# Patient Record
Sex: FEMALE | Race: WHITE | NOT HISPANIC OR LATINO | Employment: FULL TIME | URBAN - METROPOLITAN AREA
[De-identification: names, ages, dates, MRNs, and addresses within clinical notes are randomized per-mention and may not be internally consistent; named-entity substitution may affect disease eponyms.]

---

## 2017-06-10 ENCOUNTER — CONVERSION ENCOUNTER (OUTPATIENT)
Dept: RADIOLOGY | Facility: IMAGING CENTER | Age: 47
End: 2017-06-10

## 2021-07-28 ENCOUNTER — OFFICE VISIT (OUTPATIENT)
Dept: INTERNAL MEDICINE CLINIC | Facility: CLINIC | Age: 51
End: 2021-07-28
Payer: COMMERCIAL

## 2021-07-28 VITALS
TEMPERATURE: 98.6 F | DIASTOLIC BLOOD PRESSURE: 82 MMHG | HEIGHT: 66 IN | HEART RATE: 74 BPM | BODY MASS INDEX: 22.24 KG/M2 | SYSTOLIC BLOOD PRESSURE: 142 MMHG | OXYGEN SATURATION: 99 % | WEIGHT: 138.4 LBS

## 2021-07-28 DIAGNOSIS — R63.4 WEIGHT LOSS: Primary | ICD-10-CM

## 2021-07-28 DIAGNOSIS — Z11.4 ENCOUNTER FOR SCREENING FOR HIV: ICD-10-CM

## 2021-07-28 DIAGNOSIS — R10.13 EPIGASTRIC PAIN: ICD-10-CM

## 2021-07-28 DIAGNOSIS — Z11.59 ENCOUNTER FOR HEPATITIS C SCREENING TEST FOR LOW RISK PATIENT: ICD-10-CM

## 2021-07-28 PROCEDURE — 3725F SCREEN DEPRESSION PERFORMED: CPT | Performed by: INTERNAL MEDICINE

## 2021-07-28 PROCEDURE — 99214 OFFICE O/P EST MOD 30 MIN: CPT | Performed by: INTERNAL MEDICINE

## 2021-07-28 RX ORDER — FAMOTIDINE 20 MG/1
20 TABLET, FILM COATED ORAL 2 TIMES DAILY
Qty: 60 TABLET | Refills: 2 | Status: SHIPPED | OUTPATIENT
Start: 2021-07-28 | End: 2021-10-20

## 2021-07-28 RX ORDER — ACETAMINOPHEN 325 MG/1
650 TABLET ORAL EVERY 6 HOURS PRN
COMMUNITY

## 2021-07-28 NOTE — PROGRESS NOTES
Answers for HPI/ROS submitted by the patient on 7/22/2021  Chronicity: new  Onset: more than 1 month ago  Onset quality: sudden  Frequency: every several days  Episode duration: 2 days  Progression since onset: gradually improving  Pain location: epigastric region  Pain - numeric: 4/10  Pain quality: dull  Radiates to: does not radiate  anorexia: No  arthralgias: No  constipation: No  diarrhea: No  fever: No  flatus: No  headaches: No  hematochezia: No  melena: No  myalgias: No  weight loss: Yes  vomiting: No  Aggravated by: nothing  Relieved by: nothing  Diagnostic workup: GI consult     Assessment/Plan:     Diagnoses and all orders for this visit:    Weight loss  -     CT abdomen pelvis w wo contrast; Future    Epigastric pain  -     famotidine (PEPCID) 20 mg tablet; Take 1 tablet (20 mg total) by mouth 2 (two) times a day  -     CT abdomen pelvis w wo contrast; Future    Other orders  -     Omega-3 Fatty Acids (FISH OIL OMEGA-3 PO); Take 1 g by mouth daily  -     acetaminophen (TYLENOL) 325 mg tablet; Take 650 mg by mouth every 6 (six) hours as needed  -     Cholecalciferol 25 MCG (1000 UT) tablet; Take 2,000 Units by mouth daily             Subjective:   Chief Complaint   Patient presents with    Weight Loss     not loss of appetite, was 142# 4/20/21    Abdominal Pain     FLUOROSCOPY test done in April with [facility], has since seen GI, had EGD at the end of August     HM     has GI will make appt for colonoscopy after EGD, mammo in chart sent for update      Back Pain     mostly at night         Patient ID: Tre Uribe is a 48 y o  female  HPI  [de-identified] years young lady is here today for the acute visit although the symptoms are chronic she was having some problem with the epigastric pain and was seen by the [facility] and the patient received PPI which caused her the rash in the stop date  Barium upper GI was done which shows hiatal hernia and gastroesophageal reflux problem    The problems right now is that she is losing weight although she is eating well thyroid test was done which was unremarkable all the chemistries are blood test which were done in February were all within normal range I reviewed from the [facility]   In  she was 153 now she is 130  Her usual weight is 148-150  Her appetite is good and she is eating there is no problem with swallowing  Does not have any diarrhea nausea vomiting  Is not on any medication which can cause the weight loss  The following portions of the patient's history were reviewed and updated as appropriate: allergies, current medications, past family history, past medical history, past social history, past surgical history and problem list     Review of Systems   Constitutional: Negative for chills and fever  HENT: Negative for ear pain and sore throat  Eyes: Negative for pain and visual disturbance  Respiratory: Negative for cough and shortness of breath  Cardiovascular: Negative for chest pain and palpitations  Gastrointestinal: Positive for abdominal pain and nausea  Negative for constipation, diarrhea and vomiting  Genitourinary: Negative for dysuria and hematuria  Musculoskeletal: Negative for arthralgias, back pain and myalgias  Skin: Negative for color change and rash  Neurological: Negative for seizures, syncope and headaches  All other systems reviewed and are negative  History reviewed  No pertinent past medical history        Current Outpatient Medications:     Cholecalciferol 25 MCG (1000 UT) tablet, Take 2,000 Units by mouth daily, Disp: , Rfl:     acetaminophen (TYLENOL) 325 mg tablet, Take 650 mg by mouth every 6 (six) hours as needed, Disp: , Rfl:     Omega-3 Fatty Acids (FISH OIL OMEGA-3 PO), Take 1 g by mouth daily, Disp: , Rfl:     Allergies   Allergen Reactions    Omeprazole Itching       Social History   Past Surgical History:   Procedure Laterality Date    BREAST SURGERY  2017    Implants     SECTION 2004    HERNIA REPAIR  2006    Inguinal    LYMPH NODE BIOPSY  2007    Arm pit     Family History   Problem Relation Age of Onset    Coronary artery disease Mother     Arthritis Mother     Cancer Mother         Esophagus    Glaucoma Mother     Heart disease Father     Breast cancer Sister     Breast cancer additional onset Maternal Aunt        Objective:  /82 (BP Location: Left arm, Patient Position: Sitting, Cuff Size: Standard)   Pulse 74   Temp 98 6 °F (37 °C) (Temporal)   Ht 5' 6" (1 676 m)   Wt 62 8 kg (138 lb 6 4 oz)   SpO2 99%   BMI 22 34 kg/m²        Physical Exam  Constitutional:       Appearance: She is well-developed  HENT:      Right Ear: External ear normal    Eyes:      Extraocular Movements: Extraocular movements intact  Conjunctiva/sclera: Conjunctivae normal       Pupils: Pupils are equal, round, and reactive to light  Neck:      Thyroid: No thyromegaly  Vascular: No JVD  Cardiovascular:      Rate and Rhythm: Normal rate and regular rhythm  Heart sounds: Normal heart sounds  Pulmonary:      Breath sounds: Normal breath sounds  Abdominal:      General: Bowel sounds are normal       Palpations: Abdomen is soft  Musculoskeletal:         General: Normal range of motion  Cervical back: Normal range of motion  Lymphadenopathy:      Cervical: No cervical adenopathy  Skin:     General: Skin is dry  Neurological:      Mental Status: She is alert and oriented to person, place, and time  Deep Tendon Reflexes: Reflexes are normal and symmetric     Psychiatric:         Behavior: Behavior normal

## 2021-07-29 ENCOUNTER — TELEPHONE (OUTPATIENT)
Dept: ADMINISTRATIVE | Facility: OTHER | Age: 51
End: 2021-07-29

## 2021-07-29 NOTE — TELEPHONE ENCOUNTER
----- Message from Esau Zarate sent at 7/28/2021  3:16 PM EDT -----  Regarding: Breast [address] [facility] [place]  Contact: [phone]  07/28/21 3:16 PM    Hello, our patient Abram Batres has had Mammogram completed/performed  Please assist in updating the patient chart by pulling the Care Everywhere (CE) document  The date of service is 6/29/21       Thank you,  Jayson Fisher, [address] [facility]

## 2021-07-29 NOTE — TELEPHONE ENCOUNTER
----- Message from Esau Samuel sent at 7/28/2021  3:20 PM EDT -----  Regarding: Cervical [address]: [phone]  07/28/21 3:20 PM    Hello, our patient Natalie Brantley has had Pap Smear (HPV) aka Cervical Cancer Screening completed/performed  Please assist in updating the patient chart by pulling the Care Everywhere (CE) document  The date of service is 02/17/2021       Thank you,  Marc Roman, [facility]

## 2021-07-29 NOTE — TELEPHONE ENCOUNTER
Upon review of the In Basket request we were able to locate, review, and update the patient chart as requested for Pap Smear (HPV) aka Cervical Cancer Screening  Any additional questions or concerns should be emailed to the Practice Liaisons via [email or web address] email, please do not reply via In Basket      Thank you  Brendon Greenwood

## 2021-07-29 NOTE — TELEPHONE ENCOUNTER
Upon review of the In Basket request we were able to locate, review, and update the patient chart as requested for Mammogram     Any additional questions or concerns should be emailed to the Practice Liaisons via [email or web address] email, please do not reply via In Basket      Thank you  Lois Dexter

## 2021-08-05 ENCOUNTER — TELEPHONE (OUTPATIENT)
Dept: INTERNAL MEDICINE CLINIC | Age: 51
End: 2021-08-05

## 2021-08-05 ENCOUNTER — HOSPITAL ENCOUNTER (OUTPATIENT)
Dept: RADIOLOGY | Facility: IMAGING CENTER | Age: 51
Discharge: HOME | End: 2021-08-05
Payer: COMMERCIAL

## 2021-08-05 DIAGNOSIS — R63.4 WEIGHT LOSS: ICD-10-CM

## 2021-08-05 DIAGNOSIS — R10.13 EPIGASTRIC PAIN: ICD-10-CM

## 2021-08-05 PROCEDURE — G1004 CDSM NDSC: HCPCS

## 2021-08-05 PROCEDURE — 74178 CT ABD&PLV WO CNTR FLWD CNTR: CPT

## 2021-08-05 RX ADMIN — IOHEXOL 100 ML: 350 INJECTION, SOLUTION INTRAVENOUS at 08:26

## 2021-08-05 NOTE — TELEPHONE ENCOUNTER
I spoke with patient to let her know that her CT Scan was negative     If her pain comes back or doesn't go away she should call and get an appt with Dr Chelsey Conrad

## 2021-08-05 NOTE — TELEPHONE ENCOUNTER
BUDDY Per Patient called to let Dr Magan Middleton know that she got her CT Scan of her abdomen/ pelvis done this morning

## 2021-08-05 NOTE — TELEPHONE ENCOUNTER
Let her know that her CT scan was negative if she continued to have the abdominal pain then call and we will see her in the office

## 2021-08-06 ENCOUNTER — TELEPHONE (OUTPATIENT)
Dept: INTERNAL MEDICINE CLINIC | Age: 51
End: 2021-08-06

## 2021-08-06 NOTE — TELEPHONE ENCOUNTER
Pt called the office and stated she got the results of her abdomin and pelvis but she is still concerned about the weightloss and would like orders to be placed for CT scan of her lungs

## 2021-08-09 ENCOUNTER — OFFICE VISIT (OUTPATIENT)
Dept: INTERNAL MEDICINE CLINIC | Age: 51
End: 2021-08-09
Payer: COMMERCIAL

## 2021-08-09 VITALS
DIASTOLIC BLOOD PRESSURE: 82 MMHG | OXYGEN SATURATION: 98 % | BODY MASS INDEX: 22.21 KG/M2 | HEIGHT: 66 IN | HEART RATE: 68 BPM | WEIGHT: 138.2 LBS | SYSTOLIC BLOOD PRESSURE: 126 MMHG | TEMPERATURE: 98.8 F

## 2021-08-09 DIAGNOSIS — R10.13 EPIGASTRIC PAIN: ICD-10-CM

## 2021-08-09 DIAGNOSIS — Z12.11 ENCOUNTER FOR SCREENING FOR MALIGNANT NEOPLASM OF COLON: Primary | ICD-10-CM

## 2021-08-09 DIAGNOSIS — R63.4 WEIGHT LOSS: ICD-10-CM

## 2021-08-09 PROCEDURE — 99213 OFFICE O/P EST LOW 20 MIN: CPT | Performed by: INTERNAL MEDICINE

## 2021-08-09 PROCEDURE — 3008F BODY MASS INDEX DOCD: CPT | Performed by: INTERNAL MEDICINE

## 2021-08-09 PROCEDURE — 1036F TOBACCO NON-USER: CPT | Performed by: INTERNAL MEDICINE

## 2021-08-09 NOTE — PROGRESS NOTES
Assessment/Plan:     Diagnoses and all orders for this visit:    Encounter for screening for malignant neoplasm of colon  -     Ambulatory referral to Gastroenterology; Future    Weight loss  -     CT chest wo contrast; Future    Epigastric pain  -     CT chest wo contrast; Future             Subjective:   Chief Complaint   Patient presents with    Follow-up     weight loss  Review labs, CT abdomen pelvis w wo contrast        Patient ID: Arjun Duarte is a 48 y o  female  HPI  Patient is still concerned about weight she is not gaining weight maybe she lost slightly in last few weeks but she has problem with gaining weight and she is concerned about it CT scan of the abdomen and the pelvis was done which was essentially unremarkable blood workup was normal   Will get the CT scan of the chest just to complete the full workup  I told her to continue to eat whatever she likes to eat and will follow her up in about 2 months  She is going for EGD and possible colonoscopy with the GI I think that will be a good idea  She is up to date on her Pap smear and also on her mammograms on physical examination last time I did not find any reason for weight loss and now with the workup it is all negative so far  The following portions of the patient's history were reviewed and updated as appropriate: allergies, current medications, past family history, past medical history, past social history, past surgical history and problem list     Review of Systems   HENT: Negative  Eyes: Negative  Respiratory: Negative  Cardiovascular: Negative  Gastrointestinal: Negative  Endocrine: Negative  History reviewed  No pertinent past medical history        Current Outpatient Medications:     acetaminophen (TYLENOL) 325 mg tablet, Take 650 mg by mouth every 6 (six) hours as needed, Disp: , Rfl:     Cholecalciferol 25 MCG (1000 UT) tablet, Take 2,000 Units by mouth daily, Disp: , Rfl:     Omega-3 Fatty Acids (FISH OIL OMEGA-3 PO), Take 1 g by mouth daily, Disp: , Rfl:     famotidine (PEPCID) 20 mg tablet, Take 1 tablet (20 mg total) by mouth 2 (two) times a day (Patient not taking: Reported on 2021), Disp: 60 tablet, Rfl: 2  No current facility-administered medications for this visit  Allergies   Allergen Reactions    Omeprazole Itching       Social History   Past Surgical History:   Procedure Laterality Date    BREAST SURGERY      Implants     SECTION     [facility] HERNIA REPAIR      Inguinal    LYMPH NODE BIOPSY  2007    Arm pit     Family History   Problem Relation Age of Onset    Coronary artery disease Mother     Arthritis Mother     Cancer Mother         Esophagus    Glaucoma Mother     Heart disease Father     Breast cancer Sister     Breast cancer additional onset Maternal Aunt        Objective:  /82 (BP Location: Left arm, Patient Position: Sitting, Cuff Size: Standard)   Pulse 68   Temp 98 8 °F (37 1 °C) (Temporal)   Ht 5' 6" (1 676 m)   Wt 62 7 kg (138 lb 3 2 oz)   SpO2 98%   BMI 22 31 kg/m²        Physical Exam  Constitutional:       Appearance: She is well-developed  Eyes:      Pupils: Pupils are equal, round, and reactive to light  Cardiovascular:      Rate and Rhythm: Normal rate  Pulmonary:      Effort: Pulmonary effort is normal       Breath sounds: Normal breath sounds  Musculoskeletal:      Cervical back: Normal range of motion and neck supple

## 2021-08-13 ENCOUNTER — TELEPHONE (OUTPATIENT)
Dept: INTERNAL MEDICINE CLINIC | Facility: CLINIC | Age: 51
End: 2021-08-13

## 2021-08-13 NOTE — TELEPHONE ENCOUNTER
LMOM for pt to call me at [facility] office      Dr Nanci Baugh would like to know if she had a chance to schedule her colonoscopy yet  Please call office with the date when she schedules

## 2021-08-18 ENCOUNTER — TELEPHONE (OUTPATIENT)
Dept: INTERNAL MEDICINE CLINIC | Facility: CLINIC | Age: 51
End: 2021-08-18

## 2021-08-23 ENCOUNTER — TELEPHONE (OUTPATIENT)
Dept: INTERNAL MEDICINE CLINIC | Facility: CLINIC | Age: 51
End: 2021-08-23

## 2021-08-23 NOTE — TELEPHONE ENCOUNTER
Pt called and stated her  CT scan of chest wo contrast was cancelled 3 times because insurance was denying test   Can someone please look into this so pt can get imaging done  Please call pt with update    [phone]

## 2021-08-25 NOTE — TELEPHONE ENCOUNTER
As per the insurance company we will do the chest x-ray 1st before we do CT scan of the chest please let the patient know to get a 1st chest x-ray

## 2021-08-25 NOTE — TELEPHONE ENCOUNTER
The insurance will not approve it because the patient has to do the other following tests,  Blood work, chest x-ray  They want her to have these done before approving the CT scan  The request is denied as not medically necessary because: Your doctor told us that you have lost weight  Your doctor ordered CT scan of your chest to look for a cause of the weight loss  A CT is a way to take pictures of the inside of your body  This test is used when other necessary testing has not shown the cause  This testing should include ab exam by your doctor, blood tests, chest x-rays and other tests  Your doctor did not tell us that you had all other necessary testing  Based on the information we have, this test is not medically necessary for you  Dr Victor M Humphries can you please advise on what you would like order for the patient

## 2021-08-25 NOTE — TELEPHONE ENCOUNTER
Called pt as she was expecting a call from me today with status update  LMOM informing her we are waiting on update from Shelly as Shelly is looking into things for her

## 2021-08-30 ENCOUNTER — HOSPITAL ENCOUNTER (OUTPATIENT)
Dept: RADIOLOGY | Facility: IMAGING CENTER | Age: 51
Discharge: HOME | End: 2021-08-30
Payer: COMMERCIAL

## 2021-08-30 DIAGNOSIS — R05.3 CHRONIC COUGH: ICD-10-CM

## 2021-08-30 PROCEDURE — 71046 X-RAY EXAM CHEST 2 VIEWS: CPT

## 2021-09-21 LAB — COLOGUARD RESULT REPORTABLE: NEGATIVE

## 2021-10-20 ENCOUNTER — OFFICE VISIT (OUTPATIENT)
Dept: INTERNAL MEDICINE CLINIC | Age: 51
End: 2021-10-20
Payer: COMMERCIAL

## 2021-10-20 VITALS
BODY MASS INDEX: 28.33 KG/M2 | HEART RATE: 72 BPM | HEIGHT: 60 IN | SYSTOLIC BLOOD PRESSURE: 122 MMHG | OXYGEN SATURATION: 99 % | TEMPERATURE: 98.2 F | DIASTOLIC BLOOD PRESSURE: 70 MMHG | WEIGHT: 144.3 LBS

## 2021-10-20 DIAGNOSIS — R63.4 WEIGHT LOSS: ICD-10-CM

## 2021-10-20 DIAGNOSIS — R10.13 EPIGASTRIC PAIN: Primary | ICD-10-CM

## 2021-10-20 PROCEDURE — 99213 OFFICE O/P EST LOW 20 MIN: CPT | Performed by: INTERNAL MEDICINE

## 2021-10-20 PROCEDURE — 3008F BODY MASS INDEX DOCD: CPT | Performed by: INTERNAL MEDICINE

## 2021-10-20 PROCEDURE — 1036F TOBACCO NON-USER: CPT | Performed by: INTERNAL MEDICINE

## 2021-11-05 ENCOUNTER — TELEPHONE (OUTPATIENT)
Dept: ADMINISTRATIVE | Facility: OTHER | Age: 51
End: 2021-11-05

## 2022-07-01 ENCOUNTER — HOSPITAL ENCOUNTER (OUTPATIENT)
Dept: RADIOLOGY | Facility: IMAGING CENTER | Age: 52
Discharge: HOME | End: 2022-07-01
Payer: COMMERCIAL

## 2022-07-01 VITALS — HEIGHT: 66 IN | WEIGHT: 141 LBS | BODY MASS INDEX: 22.66 KG/M2

## 2022-07-01 DIAGNOSIS — Z12.31 ENCOUNTER FOR SCREENING MAMMOGRAM FOR BREAST CANCER: ICD-10-CM

## 2022-07-01 PROCEDURE — 77063 BREAST TOMOSYNTHESIS BI: CPT

## 2022-07-01 PROCEDURE — 77067 SCR MAMMO BI INCL CAD: CPT

## 2022-10-31 ENCOUNTER — OFFICE VISIT (OUTPATIENT)
Dept: INTERNAL MEDICINE CLINIC | Age: 52
End: 2022-10-31

## 2022-10-31 VITALS
TEMPERATURE: 98.2 F | HEIGHT: 67 IN | DIASTOLIC BLOOD PRESSURE: 74 MMHG | BODY MASS INDEX: 22.76 KG/M2 | OXYGEN SATURATION: 99 % | WEIGHT: 145 LBS | HEART RATE: 56 BPM | SYSTOLIC BLOOD PRESSURE: 110 MMHG

## 2022-10-31 DIAGNOSIS — Z00.00 ENCOUNTER FOR ANNUAL PHYSICAL EXAM: Primary | ICD-10-CM

## 2022-10-31 DIAGNOSIS — Z78.0 POSTMENOPAUSAL: ICD-10-CM

## 2022-10-31 DIAGNOSIS — Z00.00 ANNUAL PHYSICAL EXAM: ICD-10-CM

## 2022-10-31 PROBLEM — R63.4 WEIGHT LOSS: Status: RESOLVED | Noted: 2021-08-09 | Resolved: 2022-10-31

## 2022-10-31 PROBLEM — R10.13 EPIGASTRIC PAIN: Status: RESOLVED | Noted: 2021-08-09 | Resolved: 2022-10-31

## 2022-10-31 NOTE — PATIENT INSTRUCTIONS

## 2022-12-17 ENCOUNTER — APPOINTMENT (OUTPATIENT)
Dept: LAB | Facility: IMAGING CENTER | Age: 52
End: 2022-12-17

## 2022-12-17 DIAGNOSIS — Z00.00 ANNUAL PHYSICAL EXAM: ICD-10-CM

## 2022-12-17 LAB
ALBUMIN SERPL BCP-MCNC: 3.7 G/DL (ref 3.5–5)
ALP SERPL-CCNC: 93 U/L (ref 46–116)
ALT SERPL W P-5'-P-CCNC: 22 U/L (ref 12–78)
ANION GAP SERPL CALCULATED.3IONS-SCNC: 4 MMOL/L (ref 4–13)
AST SERPL W P-5'-P-CCNC: 15 U/L (ref 5–45)
BACTERIA UR QL AUTO: ABNORMAL /HPF
BILIRUB SERPL-MCNC: 0.74 MG/DL (ref 0.2–1)
BILIRUB UR QL STRIP: NEGATIVE
BUN SERPL-MCNC: 15 MG/DL (ref 5–25)
CALCIUM SERPL-MCNC: 9.3 MG/DL (ref 8.3–10.1)
CHLORIDE SERPL-SCNC: 106 MMOL/L (ref 96–108)
CHOLEST SERPL-MCNC: 190 MG/DL
CLARITY UR: CLEAR
CO2 SERPL-SCNC: 27 MMOL/L (ref 21–32)
COLOR UR: ABNORMAL
CREAT SERPL-MCNC: 0.8 MG/DL (ref 0.6–1.3)
ERYTHROCYTE [DISTWIDTH] IN BLOOD BY AUTOMATED COUNT: 12.2 % (ref 11.6–15.1)
GFR SERPL CREATININE-BSD FRML MDRD: 85 ML/MIN/1.73SQ M
GLUCOSE P FAST SERPL-MCNC: 95 MG/DL (ref 65–99)
GLUCOSE UR STRIP-MCNC: NEGATIVE MG/DL
HCT VFR BLD AUTO: 43.3 % (ref 34.8–46.1)
HDLC SERPL-MCNC: 62 MG/DL
HGB BLD-MCNC: 14.1 G/DL (ref 11.5–15.4)
HGB UR QL STRIP.AUTO: ABNORMAL
KETONES UR STRIP-MCNC: NEGATIVE MG/DL
LDLC SERPL CALC-MCNC: 110 MG/DL (ref 0–100)
LEUKOCYTE ESTERASE UR QL STRIP: NEGATIVE
MCH RBC QN AUTO: 31.6 PG (ref 26.8–34.3)
MCHC RBC AUTO-ENTMCNC: 32.6 G/DL (ref 31.4–37.4)
MCV RBC AUTO: 97 FL (ref 82–98)
NITRITE UR QL STRIP: NEGATIVE
NON-SQ EPI CELLS URNS QL MICRO: ABNORMAL /HPF
NONHDLC SERPL-MCNC: 128 MG/DL
PH UR STRIP.AUTO: 6 [PH]
PLATELET # BLD AUTO: 200 THOUSANDS/UL (ref 149–390)
PMV BLD AUTO: 12.1 FL (ref 8.9–12.7)
POTASSIUM SERPL-SCNC: 4.2 MMOL/L (ref 3.5–5.3)
PROT SERPL-MCNC: 7.6 G/DL (ref 6.4–8.4)
PROT UR STRIP-MCNC: NEGATIVE MG/DL
RBC # BLD AUTO: 4.46 MILLION/UL (ref 3.81–5.12)
RBC #/AREA URNS AUTO: ABNORMAL /HPF
SODIUM SERPL-SCNC: 137 MMOL/L (ref 135–147)
SP GR UR STRIP.AUTO: 1.02 (ref 1–1.03)
TRIGL SERPL-MCNC: 91 MG/DL
TSH SERPL DL<=0.05 MIU/L-ACNC: 2.18 UIU/ML (ref 0.45–4.5)
UROBILINOGEN UR STRIP-ACNC: <2 MG/DL
WBC # BLD AUTO: 3.84 THOUSAND/UL (ref 4.31–10.16)
WBC #/AREA URNS AUTO: ABNORMAL /HPF

## 2022-12-22 DIAGNOSIS — R31.29 MICROSCOPIC HEMATURIA: Primary | ICD-10-CM

## 2022-12-23 ENCOUNTER — HOSPITAL ENCOUNTER (OUTPATIENT)
Dept: RADIOLOGY | Facility: IMAGING CENTER | Age: 52
End: 2022-12-23

## 2022-12-23 DIAGNOSIS — R31.29 MICROSCOPIC HEMATURIA: ICD-10-CM

## 2023-04-28 ENCOUNTER — HOSPITAL ENCOUNTER (OUTPATIENT)
Dept: RADIOLOGY | Facility: IMAGING CENTER | Age: 53
Discharge: HOME | End: 2023-04-28

## 2023-04-28 DIAGNOSIS — M54.16 LUMBAR RADICULOPATHY: ICD-10-CM

## 2023-07-06 ENCOUNTER — HOSPITAL ENCOUNTER (OUTPATIENT)
Dept: RADIOLOGY | Facility: IMAGING CENTER | Age: 53
End: 2023-07-06
Payer: COMMERCIAL

## 2023-07-06 VITALS — WEIGHT: 145.06 LBS | BODY MASS INDEX: 22.77 KG/M2 | HEIGHT: 67 IN

## 2023-07-06 DIAGNOSIS — Z12.31 OTHER SCREENING MAMMOGRAM: ICD-10-CM

## 2023-07-06 DIAGNOSIS — Z12.31 ENCOUNTER FOR SCREENING MAMMOGRAM FOR MALIGNANT NEOPLASM OF BREAST: ICD-10-CM

## 2023-07-06 PROCEDURE — 77067 SCR MAMMO BI INCL CAD: CPT

## 2023-07-06 PROCEDURE — 77063 BREAST TOMOSYNTHESIS BI: CPT

## 2023-11-16 ENCOUNTER — OFFICE VISIT (OUTPATIENT)
Dept: INTERNAL MEDICINE CLINIC | Age: 53
End: 2023-11-16
Payer: COMMERCIAL

## 2023-11-16 VITALS
SYSTOLIC BLOOD PRESSURE: 122 MMHG | OXYGEN SATURATION: 99 % | WEIGHT: 137 LBS | DIASTOLIC BLOOD PRESSURE: 64 MMHG | TEMPERATURE: 98.4 F | BODY MASS INDEX: 21.5 KG/M2 | HEIGHT: 67 IN | HEART RATE: 57 BPM

## 2023-11-16 DIAGNOSIS — Z00.00 ANNUAL PHYSICAL EXAM: Primary | ICD-10-CM

## 2023-11-16 PROCEDURE — 99396 PREV VISIT EST AGE 40-64: CPT | Performed by: INTERNAL MEDICINE

## 2023-11-16 RX ORDER — MOMETASONE FUROATE 1 MG/G
CREAM TOPICAL DAILY
COMMUNITY
Start: 2023-05-25 | End: 2024-05-24

## 2023-11-16 NOTE — PATIENT INSTRUCTIONS
Wellness Visit for Adults   AMBULATORY CARE:   A wellness visit  is when you see your healthcare provider to get screened for health problems. Your healthcare provider will also give you advice on how to stay healthy. Write down your questions so you remember to ask them. Ask your healthcare provider how often you should have a wellness visit. What happens at a wellness visit:  Your healthcare provider will ask about your health, and your family history of health problems. This includes high blood pressure, heart disease, and cancer. He or she will ask if you have symptoms that concern you, if you smoke, and about your mood. You may also be asked about your intake of medicines, supplements, food, and alcohol. Any of the following may be done: Your weight  will be checked. Your height may also be checked so your body mass index (BMI) can be calculated. Your BMI shows if you are at a healthy weight. Your blood pressure  and heart rate will be checked. Your temperature may also be checked. Blood and urine tests  may be done. Blood tests may be done to check your cholesterol levels. Abnormal cholesterol levels increase your risk for heart disease and stroke. You may also need a blood or urine test to check for diabetes if you are at increased risk. Urine tests may be done to look for signs of an infection or kidney disease. A physical exam  includes checking your heartbeat and lungs with a stethoscope. Your healthcare provider may also check your skin to look for sun damage. Screening tests  may be recommended. A screening test is done to check for diseases that may not cause symptoms. The screening tests you may need depend on your age, gender, family history, and lifestyle habits. For example, colorectal screening may be recommended if you are 48years old or older. Screening tests you need if you are a woman:   A Pap smear  is used to screen for cervical cancer.  Pap smears are usually done every 3 to 5 years depending on your age. You may need them more often if you have had abnormal Pap smear test results in the past. Ask your healthcare provider how often you should have a Pap smear. A mammogram  is an x-ray of your breasts to screen for breast cancer. Experts recommend mammograms every 2 years starting at age 48 years. You may need a mammogram at age 52 years or younger if you have an increased risk for breast cancer. Talk to your healthcare provider about when you should start having mammograms and how often you need them. Vaccines you may need:   Get an influenza vaccine  every year. The influenza vaccine protects you from the flu. Several types of viruses cause the flu. The viruses change over time, so new vaccines are made each year. Get a tetanus-diphtheria (Td) booster vaccine  every 10 years. This vaccine protects you against tetanus and diphtheria. Tetanus is a severe infection that may cause painful muscle spasms and lockjaw. Diphtheria is a severe bacterial infection that causes a thick covering in the back of your mouth and throat. Get a human papillomavirus (HPV) vaccine  if you are female and aged 23 to 32 or male 23 to 24 and never received it. This vaccine protects you from HPV infection. HPV is the most common infection spread by sexual contact. HPV may also cause vaginal, penile, and anal cancers. Get a pneumococcal vaccine  if you are aged 72 years or older. The pneumococcal vaccine is an injection given to protect you from pneumococcal disease. Pneumococcal disease is an infection caused by pneumococcal bacteria. The infection may cause pneumonia, meningitis, or an ear infection. Get a shingles vaccine  if you are 60 or older, even if you have had shingles before. The shingles vaccine is an injection to protect you from the varicella-zoster virus. This is the same virus that causes chickenpox.  Shingles is a painful rash that develops in people who had chickenpox or have been exposed to the virus. How to eat healthy:  My Plate is a model for planning healthy meals. It shows the types and amounts of foods that should go on your plate. Fruits and vegetables make up about half of your plate, and grains and protein make up the other half. A serving of dairy is included on the side of your plate. The amount of calories and serving sizes you need depends on your age, gender, weight, and height. Examples of healthy foods are listed below:  Eat a variety of vegetables  such as dark green, red, and orange vegetables. You can also include canned vegetables low in sodium (salt) and frozen vegetables without added butter or sauces. Eat a variety of fresh fruits , canned fruit in 100% juice, frozen fruit, and dried fruit. Include whole grains. At least half of the grains you eat should be whole grains. Examples include whole-wheat bread, wheat pasta, brown rice, and whole-grain cereals such as oatmeal.    Eat a variety of protein foods such as seafood (fish and shellfish), lean meat, and poultry without skin (turkey and chicken). Examples of lean meats include pork leg, shoulder, or tenderloin, and beef round, sirloin, tenderloin, and extra lean ground beef. Other protein foods include eggs and egg substitutes, beans, peas, soy products, nuts, and seeds. Choose low-fat dairy products such as skim or 1% milk or low-fat yogurt, cheese, and cottage cheese. Limit unhealthy fats  such as butter, hard margarine, and shortening. Exercise:  Exercise at least 30 minutes per day on most days of the week. Some examples of exercise include walking, biking, dancing, and swimming. You can also fit in more physical activity by taking the stairs instead of the elevator or parking farther away from stores. Include muscle strengthening activities 2 days each week. Regular exercise provides many health benefits.  It helps you manage your weight, and decreases your risk for type 2 diabetes, heart disease, stroke, and high blood pressure. Exercise can also help improve your mood. Ask your healthcare provider about the best exercise plan for you. General health and safety guidelines:   Do not smoke. Nicotine and other chemicals in cigarettes and cigars can cause lung damage. Ask your healthcare provider for information if you currently smoke and need help to quit. E-cigarettes or smokeless tobacco still contain nicotine. Talk to your healthcare provider before you use these products. Limit alcohol. A drink of alcohol is 12 ounces of beer, 5 ounces of wine, or 1½ ounces of liquor. Lose weight, if needed. Being overweight increases your risk of certain health conditions. These include heart disease, high blood pressure, type 2 diabetes, and certain types of cancer. Protect your skin. Do not sunbathe or use tanning beds. Use sunscreen with a SPF 15 or higher. Apply sunscreen at least 15 minutes before you go outside. Reapply sunscreen every 2 hours. Wear protective clothing, hats, and sunglasses when you are outside. Drive safely. Always wear your seatbelt. Make sure everyone in your car wears a seatbelt. A seatbelt can save your life if you are in an accident. Do not use your cell phone when you are driving. This could distract you and cause an accident. Pull over if you need to make a call or send a text message. Practice safe sex. Use latex condoms if are sexually active and have more than one partner. Your healthcare provider may recommend screening tests for sexually transmitted infections (STIs). Wear helmets, lifejackets, and protective gear. Always wear a helmet when you ride a bike or motorcycle, go skiing, or play sports that could cause a head injury. Wear protective equipment when you play sports. Wear a lifejacket when you are on a boat or doing water sports.     © Copyright [employer] 2023 Information is for End User's use only and may not be sold, redistributed or otherwise used for commercial purposes. The above information is an  only. It is not intended as medical advice for individual conditions or treatments. Talk to your doctor, nurse or pharmacist before following any medical regimen to see if it is safe and effective for you.

## 2023-11-16 NOTE — PROGRESS NOTES
Assessment/Plan:    Annual physical examination  - History and physical examination done  - Pt was counseled to eat a heart healthy diet, to drink at least 2 L of water daily, to take a daily multivitamin and to exercise for at least 30 minutes of cardio exercise daily, for at least 5 days a week. - CBC, CMP, TSH and lipid panel have been ordered and we will follow-up with results.  -She is up-to-date with 4 COVID vaccines and her flu shot and will think about getting the shingles vaccine.  -She is up-to-date with her Pap smear, mammogram and Cologuard test.  - follow up in 1 year for an annual physical exam or sooner as needed. Diagnoses and all orders for this visit:    Annual physical exam  -     CBC and differential; Future  -     TSH, 3rd generation with Free T4 reflex; Future  -     Comprehensive metabolic panel; Future  -     Lipid panel; Future    Other orders  -     mometasone (ELOCON) 0.1 % cream; Apply topically daily          Subjective:      Patient ID: Indra Antunez is a 46 y.o. female.     HPI      Patient presents for an annual physical exam.    Last annual physical exam- last year     Past medical history- none    Past surgical history- umbilical hernia repair , c section    Medications- see list     Allergies- omeprazole     Diet-mixture of balanced diet and junk, drinks about a bottle and a half of water    Exercise- none    Alcohol use- socially with a max of 2-3 drinks    Caffeine and soda use-none    Nicotine use- never    Recreational drug use-none    Work- billing     Sexual history, STD history and HIV testing- monogamous with  , std hx - hpv, hiv testing- done and neg    Gynecological history/Prostate health/testicular health history- LMP - 2.5 years ago, last pap smear - 2023, last mammogram - 2023    Colonoscopy- cologuard in 2021    Immunization history- up to date with the covid shot x 4, flu shot,     Dental visit- every 6 months     Vision- readers    Family history-  Heart ds - dad  Hld - dad  Esophageal ca - mom  Breast ca -sis, M aunt       Today, patient has no complaints. She admits to occasional diaphoresis likely secondary to hot flashes, residual postnasal drip, and intermittent cough secondary to her recent upper respiratory tract infection that is still resolving, occasional constipation and back pain but she denies any fever, chills, headache, dizziness, nasal congestion, runny nose,  sore throat, ear ache, chest pain, sob, palpitations, nausea, vomiting, diarrhea,  hematochezia, hematuria, melena stools, feelings of anxiety,depression or insomnia, arthralgias or myalgias. .        The following portions of the patient's history were reviewed and updated as appropriate: She  has a past medical history of BRCA1 negative and BRCA2 negative. She   Patient Active Problem List    Diagnosis Date Noted   • Annual physical exam 10/31/2022   • Postmenopausal 10/31/2022     She  has a past surgical history that includes Breast surgery ();  section (); Hernia repair (); Lymph node biopsy (); and Augmentation mammaplasty. Her family history includes Arthritis in her mother; Breast cancer (age of onset: 50) in her sister; Breast cancer additional onset (age of onset: 80) in her maternal aunt; Cancer (age of onset: 80) in her mother; Coronary artery disease in her mother; Glaucoma in her mother; Heart disease in her father; Lung cancer in her maternal grandfather; No Known Problems in her daughter, maternal grandmother, maternal uncle, paternal grandfather, and paternal grandmother; Throat cancer in her maternal uncle. She  reports that she has never smoked. She has never used smokeless tobacco. She reports current alcohol use of about 1.0 standard drink of alcohol per week. She reports that she does not use drugs.   Current Outpatient Medications   Medication Sig Dispense Refill   • acetaminophen (TYLENOL) 325 mg tablet Take 650 mg by mouth every 6 (six) hours as needed     • Cholecalciferol 25 MCG (1000 UT) tablet Take 2,000 Units by mouth daily     • mometasone (ELOCON) 0.1 % cream Apply topically daily     • Omega-3 Fatty Acids (FISH OIL OMEGA-3 PO) Take 1 g by mouth daily       No current facility-administered medications for this visit. Current Outpatient Medications on File Prior to Visit   Medication Sig   • acetaminophen (TYLENOL) 325 mg tablet Take 650 mg by mouth every 6 (six) hours as needed   • Cholecalciferol 25 MCG (1000 UT) tablet Take 2,000 Units by mouth daily   • mometasone (ELOCON) 0.1 % cream Apply topically daily   • Omega-3 Fatty Acids (FISH OIL OMEGA-3 PO) Take 1 g by mouth daily     No current facility-administered medications on file prior to visit. She is allergic to omeprazole. .    Review of Systems   Constitutional:  Positive for diaphoresis (hot flashes). Negative for activity change, chills, fatigue, fever and unexpected weight change. HENT:  Positive for postnasal drip. Negative for congestion, ear pain, rhinorrhea, sinus pressure and sore throat. Eyes:  Negative for pain. Respiratory:  Positive for cough (intermittent). Negative for choking, chest tightness, shortness of breath and wheezing. Cardiovascular:  Negative for chest pain, palpitations and leg swelling. Gastrointestinal:  Positive for constipation. Negative for abdominal pain, diarrhea, nausea and vomiting. Genitourinary:  Negative for dysuria and hematuria. Musculoskeletal:  Positive for back pain. Negative for arthralgias, gait problem, joint swelling, myalgias and neck stiffness. Skin:  Negative for pallor and rash. Neurological:  Negative for dizziness, tremors, seizures, syncope, light-headedness and headaches. Hematological:  Negative for adenopathy. Psychiatric/Behavioral:  Negative for behavioral problems, dysphoric mood and sleep disturbance. The patient is not nervous/anxious.           Objective:      /64 (BP Location: Left arm, Patient Position: Sitting, Cuff Size: Standard)   Pulse 57   Temp 98.4 °F (36.9 °C) (Temporal)   Ht 5' 6.5" (1.689 m)   Wt 62.1 kg (137 lb)   SpO2 99%   BMI 21.78 kg/m²          Physical Exam  Constitutional:       General: She is not in acute distress. Appearance: She is well-developed. She is not diaphoretic. HENT:      Head: Normocephalic and atraumatic. Right Ear: External ear normal.      Left Ear: External ear normal.      Nose: Nasal tenderness and mucosal edema present. Mouth/Throat:      Mouth: Mucous membranes are dry. Pharynx: Posterior oropharyngeal erythema present. No oropharyngeal exudate. Eyes:      General: No scleral icterus. Right eye: No discharge. Left eye: No discharge. Conjunctiva/sclera: Conjunctivae normal.      Pupils: Pupils are equal, round, and reactive to light. Neck:      Thyroid: No thyromegaly. Vascular: No JVD. Trachea: No tracheal deviation. Cardiovascular:      Rate and Rhythm: Normal rate and regular rhythm. Heart sounds: Normal heart sounds. No murmur heard. No friction rub. No gallop. Pulmonary:      Effort: Pulmonary effort is normal. No respiratory distress. Breath sounds: Normal breath sounds. No wheezing or rales. Chest:      Chest wall: No tenderness. Abdominal:      General: Bowel sounds are normal. There is no distension. Palpations: Abdomen is soft. There is no mass. Tenderness: There is no abdominal tenderness. There is no guarding or rebound. Musculoskeletal:         General: No tenderness or deformity. Normal range of motion. Cervical back: Normal range of motion and neck supple. Lymphadenopathy:      Cervical: No cervical adenopathy. Skin:     General: Skin is warm and dry. Coloration: Skin is not pale. Findings: No erythema or rash. Neurological:      Mental Status: She is alert and oriented to person, place, and time.       Cranial Nerves: No cranial nerve deficit. Motor: No abnormal muscle tone. Coordination: Coordination normal.      Deep Tendon Reflexes: Reflexes are normal and symmetric. Comments: Cranial nerves 2-12 are intact bilaterally  Muscle strength is 5/5 in all extremities  Sensation is intact in bilateral face and extremities  Rapid alternating movement and finger-to-nose pointing test intact   Deep tendon reflexes are 2+ bilaterally  Gait is intact         Psychiatric:         Behavior: Behavior normal.           Appointment on 12/17/2022   Component Date Value Ref Range Status   • Cholesterol 12/17/2022 190  See Comment mg/dL Final    Cholesterol:         Pediatric <18 Years        Desirable          <170 mg/dL      Borderline High    170-199 mg/dL      High               >=200 mg/dL        Adult >=18 Years            Desirable         <200 mg/dL      Borderline High   200-239 mg/dL      High              >239 mg/dL     • Triglycerides 12/17/2022 91  See Comment mg/dL Final    Triglyceride:     0-9Y            <75mg/dL     10Y-17Y         <90 mg/dL       >=18Y     Normal          <150 mg/dL     Borderline High 150-199 mg/dL     High            200-499 mg/dL        Very High       >499 mg/dL    Specimen collection should occur prior to N-Acetylcysteine or Metamizole administration due to the potential for falsely depressed results. • HDL, Direct 12/17/2022 62  >=50 mg/dL Final    Specimen collection should occur prior to Metamizole administration due to the potential for falsley depressed results. • LDL Calculated 12/17/2022 110 (H)  0 - 100 mg/dL Final    LDL Cholesterol:     Optimal           <100 mg/dl     Near Optimal      100-129 mg/dl     Above Optimal       Borderline High 130-159 mg/dl       High            160-189 mg/dl       Very High       >189 mg/dl         This screening LDL is a calculated result.    It does not have the accuracy of the Direct Measured LDL in the monitoring of patients with hyperlipidemia and/or statin therapy. Direct Measure LDL (FZZ550) must be ordered separately in these patients. • Non-HDL-Chol (CHOL-HDL) 12/17/2022 128  mg/dl Final   • WBC 12/17/2022 3.84 (L)  4.31 - 10.16 Thousand/uL Final   • RBC 12/17/2022 4.46  3.81 - 5.12 Million/uL Final   • Hemoglobin 12/17/2022 14.1  11.5 - 15.4 g/dL Final   • Hematocrit 12/17/2022 43.3  34.8 - 46.1 % Final   • MCV 12/17/2022 97  82 - 98 fL Final   • MCH 12/17/2022 31.6  26.8 - 34.3 pg Final   • MCHC 12/17/2022 32.6  31.4 - 37.4 g/dL Final   • RDW 12/17/2022 12.2  11.6 - 15.1 % Final   • Platelets 01/10/0616 200  149 - 390 Thousands/uL Final   • MPV 12/17/2022 12.1  8.9 - 12.7 fL Final   • Sodium 12/17/2022 137  135 - 147 mmol/L Final   • Potassium 12/17/2022 4.2  3.5 - 5.3 mmol/L Final   • Chloride 12/17/2022 106  96 - 108 mmol/L Final   • CO2 12/17/2022 27  21 - 32 mmol/L Final   • ANION GAP 12/17/2022 4  4 - 13 mmol/L Final   • BUN 12/17/2022 15  5 - 25 mg/dL Final   • Creatinine 12/17/2022 0.80  0.60 - 1.30 mg/dL Final    Standardized to IDMS reference method   • Glucose, Fasting 12/17/2022 95  65 - 99 mg/dL Final    Specimen collection should occur prior to Sulfasalazine administration due to the potential for falsely depressed results. Specimen collection should occur prior to Sulfapyridine administration due to the potential for falsely elevated results. • Calcium 12/17/2022 9.3  8.3 - 10.1 mg/dL Final   • AST 12/17/2022 15  5 - 45 U/L Final    Specimen collection should occur prior to Sulfasalazine administration due to the potential for falsely depressed results. • ALT 12/17/2022 22  12 - 78 U/L Final    Specimen collection should occur prior to Sulfasalazine and/or Sulfapyridine administration due to the potential for falsely depressed results.     • Alkaline Phosphatase 12/17/2022 93  46 - 116 U/L Final   • Total Protein 12/17/2022 7.6  6.4 - 8.4 g/dL Final   • Albumin 12/17/2022 3.7  3.5 - 5.0 g/dL Final   • Total Bilirubin 12/17/2022 0. 74  0.20 - 1.00 mg/dL Final    Use of this assay is not recommended for patients undergoing treatment with eltrombopag due to the potential for falsely elevated results. • eGFR 12/17/2022 85  ml/min/1.73sq m Final   • TSH 3RD GENERATON 12/17/2022 2.180  0.450 - 4.500 uIU/mL Final    The recommended reference ranges for TSH during pregnancy are as follows:   First trimester 0.1 to 2.5 uIU/mL   Second trimester  0.2 to 3.0 uIU/mL   Third trimester 0.3 to 3.0 uIU/m    Note: Normal ranges may not apply to patients who are transgender, non-binary, or whose legal sex, sex at birth, and gender identity differ. Adult TSH (3rd generation) reference range follows the recommended guidelines of the [employer], January, 2020.

## 2023-11-25 ENCOUNTER — OFFICE VISIT (OUTPATIENT)
Dept: URGENT CARE | Age: 53
End: 2023-11-25
Payer: COMMERCIAL

## 2023-11-25 ENCOUNTER — APPOINTMENT (OUTPATIENT)
Dept: RADIOLOGY | Age: 53
End: 2023-11-25
Payer: COMMERCIAL

## 2023-11-25 VITALS
BODY MASS INDEX: 22.82 KG/M2 | WEIGHT: 142 LBS | HEART RATE: 65 BPM | TEMPERATURE: 98 F | SYSTOLIC BLOOD PRESSURE: 157 MMHG | HEIGHT: 66 IN | RESPIRATION RATE: 20 BRPM | OXYGEN SATURATION: 99 % | DIASTOLIC BLOOD PRESSURE: 81 MMHG

## 2023-11-25 DIAGNOSIS — R07.89 CHEST WALL PAIN: ICD-10-CM

## 2023-11-25 DIAGNOSIS — R05.3 PERSISTENT COUGH: ICD-10-CM

## 2023-11-25 DIAGNOSIS — J40 BRONCHITIS: ICD-10-CM

## 2023-11-25 DIAGNOSIS — R07.89 CHEST WALL PAIN: Primary | ICD-10-CM

## 2023-11-25 PROCEDURE — 99213 OFFICE O/P EST LOW 20 MIN: CPT | Performed by: PHYSICIAN ASSISTANT

## 2023-11-25 PROCEDURE — 71101 X-RAY EXAM UNILAT RIBS/CHEST: CPT

## 2023-11-25 RX ORDER — PREDNISONE 20 MG/1
TABLET ORAL
Qty: 10 TABLET | Refills: 0 | Status: SHIPPED | OUTPATIENT
Start: 2023-11-25

## 2023-11-25 RX ORDER — AZITHROMYCIN 250 MG/1
TABLET, FILM COATED ORAL
Qty: 6 TABLET | Refills: 0 | Status: SHIPPED | OUTPATIENT
Start: 2023-11-25 | End: 2023-11-30

## 2023-11-25 NOTE — PROGRESS NOTES
[facility]    NAME: Anuel Park is a 48 y.o. female  : 1970    MRN: [id number]  DATE: 2023  TIME: 12:03 PM    Assessment and Plan   Chest wall pain [R07.89]  1. Chest wall pain  XR ribs right w pa chest min 3 views    predniSONE 20 mg tablet      2. Bronchitis  predniSONE 20 mg tablet    azithromycin (ZITHROMAX) 250 mg tablet      3. Persistent cough  predniSONE 20 mg tablet    azithromycin (ZITHROMAX) 250 mg tablet        Right rib series and PA chest: No acute bony changes. No acute cardiopulmonary abnormalities noted. Await radiologist final test results and    Patient Instructions     Patient Instructions   Take prednisone as instructed. While on prednisone do not take any ibuprofen or ibuprofen like products. May safely take Tylenol if needed. Take antibiotic as instructed. Call primary care provider offices soon as possible to arrange follow-up for probably 10 to 14 days. If you would develop any severe weakness,    If you would develop any significant weakness, worsening shortness of breath and/or chest pain proceed immediately to emergency room for further evaluation. Chief Complaint     Chief Complaint   Patient presents with    Cough     Cough x 5-6 weeks, right rib pain x 2 weeks       History of Present Illness   Anuel Park presents to the clinic c/o  Pt comes in with complaint of: Right chest wall pain, persistent cough    Started: Started with cold symptoms about 5 to 6 weeks ago. Nasal congestion has settled down but she has persisted postnasal drip and cough and has felt a little shortness of breath. Main concern is that she is having pain right rib area underneath bra line. Associated symptoms: No fever or chills. Modifying factors: Pain is worse with coughing, sneezing, getting up and down from a laying position. Recent travel:  denies. Hx asthma:  denies. Hx pneumonia:  denies. Smoker:  denies.          Review of Systems   Review of Systems Constitutional:  Positive for activity change and fatigue. Negative for appetite change, chills, diaphoresis and fever. HENT:  Positive for congestion and postnasal drip. Negative for ear discharge, ear pain, rhinorrhea, sinus pressure, sinus pain and sore throat. Eyes: Negative. Respiratory:  Positive for cough. Negative for chest tightness, shortness of breath and wheezing. Cardiovascular:  Positive for chest pain. Hematological: Negative.         Current Medications     Long-Term Medications   Medication Sig Dispense Refill    Cholecalciferol 25 MCG (1000 UT) tablet Take 2,000 Units by mouth daily      mometasone (ELOCON) 0.1 % cream Apply topically daily         Current Allergies     Allergies as of 2023 - Reviewed 2023   Allergen Reaction Noted    Omeprazole Itching 2021          The following portions of the patient's history were reviewed and updated as appropriate: allergies, current medications, past family history, past medical history, past social history, past surgical history and problem list.  Past Medical History:   Diagnosis Date    BRCA1 negative     BRCA2 negative      Past Surgical History:   Procedure Laterality Date    AUGMENTATION MAMMAPLASTY      BREAST SURGERY  2017    Implants     SECTION  2004    HERNIA REPAIR  2006    Inguinal    LYMPH NODE BIOPSY  2007    Arm pit     Family History   Problem Relation Age of Onset    Coronary artery disease Mother     Arthritis Mother     Cancer Mother 80        Esophagus    Glaucoma Mother     Heart disease Father     Breast cancer Sister 50    No Known Problems Daughter     No Known Problems Maternal Grandmother     Lung cancer Maternal Grandfather     No Known Problems Paternal Grandmother     No Known Problems Paternal Grandfather     Breast cancer additional onset Maternal Aunt 80    Throat cancer Maternal Uncle     No Known Problems Maternal Uncle        Objective   /81   Pulse 65   Temp 98 °F (36.7 °C) Resp 20   Ht 5' 6" (1.676 m)   Wt 64.4 kg (142 lb)   SpO2 99%   BMI 22.92 kg/m²   No LMP recorded. Patient is postmenopausal.       Physical Exam     Physical Exam  Vitals and nursing note reviewed. Constitutional:       General: She is not in acute distress. Appearance: Normal appearance. She is not ill-appearing, toxic-appearing or diaphoretic. Comments: Accompanied by stepdaughter   HENT:      Head: Normocephalic and atraumatic. Nose: No congestion or rhinorrhea. Mouth/Throat:      Mouth: Mucous membranes are moist.      Pharynx: No oropharyngeal exudate or posterior oropharyngeal erythema. Comments: Cobblestoning posterior pharynx  Eyes:      Extraocular Movements: Extraocular movements intact. Pupils: Pupils are equal, round, and reactive to light. Cardiovascular:      Rate and Rhythm: Normal rate and regular rhythm. Heart sounds: Normal heart sounds. No murmur heard. No friction rub. No gallop. Pulmonary:      Effort: Pulmonary effort is normal. No respiratory distress. Breath sounds: Normal breath sounds. No stridor. No wheezing, rhonchi or rales. Comments: Pain reciprocated with a compression of chest and localized palpation right anterior lower chest wall below bra line. No obvious palpable masses or swelling. Chest:      Chest wall: Tenderness present. Musculoskeletal:      Cervical back: Normal range of motion and neck supple. No rigidity or tenderness. Lymphadenopathy:      Cervical: No cervical adenopathy. Skin:     General: Skin is warm and dry. Coloration: Skin is not pale. Findings: No bruising, erythema, lesion or rash. Neurological:      General: No focal deficit present. Mental Status: She is alert and oriented to person, place, and time.    Psychiatric:         Mood and Affect: Mood normal.         Behavior: Behavior normal.

## 2023-11-25 NOTE — PATIENT INSTRUCTIONS
Take prednisone as instructed. While on prednisone do not take any ibuprofen or ibuprofen like products. May safely take Tylenol if needed. Take antibiotic as instructed. Call primary care provider offices soon as possible to arrange follow-up for probably 10 to 14 days. If you would develop any severe weakness,    If you would develop any significant weakness, worsening shortness of breath and/or chest pain proceed immediately to emergency room for further evaluation.

## 2024-02-17 ENCOUNTER — APPOINTMENT (OUTPATIENT)
Dept: LAB | Facility: IMAGING CENTER | Age: 54
End: 2024-02-17
Payer: COMMERCIAL

## 2024-02-17 DIAGNOSIS — Z00.00 ANNUAL PHYSICAL EXAM: ICD-10-CM

## 2024-02-17 LAB
ALBUMIN SERPL BCP-MCNC: 4.2 G/DL (ref 3.5–5)
ALP SERPL-CCNC: 72 U/L (ref 34–104)
ALT SERPL W P-5'-P-CCNC: 12 U/L (ref 7–52)
ANION GAP SERPL CALCULATED.3IONS-SCNC: 8 MMOL/L
AST SERPL W P-5'-P-CCNC: 15 U/L (ref 13–39)
BASOPHILS # BLD AUTO: 0.03 THOUSANDS/ÂΜL (ref 0–0.1)
BASOPHILS NFR BLD AUTO: 1 % (ref 0–1)
BILIRUB SERPL-MCNC: 0.77 MG/DL (ref 0.2–1)
BUN SERPL-MCNC: 15 MG/DL (ref 5–25)
CALCIUM SERPL-MCNC: 9.4 MG/DL (ref 8.4–10.2)
CHLORIDE SERPL-SCNC: 104 MMOL/L (ref 96–108)
CHOLEST SERPL-MCNC: 206 MG/DL
CO2 SERPL-SCNC: 29 MMOL/L (ref 21–32)
CREAT SERPL-MCNC: 0.75 MG/DL (ref 0.6–1.3)
EOSINOPHIL # BLD AUTO: 0.08 THOUSAND/ÂΜL (ref 0–0.61)
EOSINOPHIL NFR BLD AUTO: 2 % (ref 0–6)
ERYTHROCYTE [DISTWIDTH] IN BLOOD BY AUTOMATED COUNT: 11.8 % (ref 11.6–15.1)
GFR SERPL CREATININE-BSD FRML MDRD: 91 ML/MIN/1.73SQ M
GLUCOSE P FAST SERPL-MCNC: 83 MG/DL (ref 65–99)
HCT VFR BLD AUTO: 42.4 % (ref 34.8–46.1)
HDLC SERPL-MCNC: 58 MG/DL
HGB BLD-MCNC: 13.9 G/DL (ref 11.5–15.4)
IMM GRANULOCYTES # BLD AUTO: 0.01 THOUSAND/UL (ref 0–0.2)
IMM GRANULOCYTES NFR BLD AUTO: 0 % (ref 0–2)
LDLC SERPL CALC-MCNC: 125 MG/DL (ref 0–100)
LYMPHOCYTES # BLD AUTO: 1.39 THOUSANDS/ÂΜL (ref 0.6–4.47)
LYMPHOCYTES NFR BLD AUTO: 38 % (ref 14–44)
MCH RBC QN AUTO: 31.4 PG (ref 26.8–34.3)
MCHC RBC AUTO-ENTMCNC: 32.8 G/DL (ref 31.4–37.4)
MCV RBC AUTO: 96 FL (ref 82–98)
MONOCYTES # BLD AUTO: 0.38 THOUSAND/ÂΜL (ref 0.17–1.22)
MONOCYTES NFR BLD AUTO: 10 % (ref 4–12)
NEUTROPHILS # BLD AUTO: 1.75 THOUSANDS/ÂΜL (ref 1.85–7.62)
NEUTS SEG NFR BLD AUTO: 49 % (ref 43–75)
NONHDLC SERPL-MCNC: 148 MG/DL
NRBC BLD AUTO-RTO: 0 /100 WBCS
PLATELET # BLD AUTO: 220 THOUSANDS/UL (ref 149–390)
PMV BLD AUTO: 12.1 FL (ref 8.9–12.7)
POTASSIUM SERPL-SCNC: 4.1 MMOL/L (ref 3.5–5.3)
PROT SERPL-MCNC: 6.7 G/DL (ref 6.4–8.4)
RBC # BLD AUTO: 4.42 MILLION/UL (ref 3.81–5.12)
SODIUM SERPL-SCNC: 141 MMOL/L (ref 135–147)
TRIGL SERPL-MCNC: 117 MG/DL
TSH SERPL DL<=0.05 MIU/L-ACNC: 1.65 UIU/ML (ref 0.45–4.5)
WBC # BLD AUTO: 3.64 THOUSAND/UL (ref 4.31–10.16)

## 2024-02-17 PROCEDURE — 80053 COMPREHEN METABOLIC PANEL: CPT

## 2024-02-17 PROCEDURE — 84443 ASSAY THYROID STIM HORMONE: CPT

## 2024-02-17 PROCEDURE — 85025 COMPLETE CBC W/AUTO DIFF WBC: CPT

## 2024-02-17 PROCEDURE — 80061 LIPID PANEL: CPT

## 2024-02-17 PROCEDURE — 36415 COLL VENOUS BLD VENIPUNCTURE: CPT

## 2024-03-04 ENCOUNTER — OFFICE VISIT (OUTPATIENT)
Dept: INTERNAL MEDICINE CLINIC | Age: 54
End: 2024-03-04
Payer: COMMERCIAL

## 2024-03-04 VITALS
DIASTOLIC BLOOD PRESSURE: 60 MMHG | HEIGHT: 66 IN | BODY MASS INDEX: 23.78 KG/M2 | HEART RATE: 57 BPM | WEIGHT: 148 LBS | SYSTOLIC BLOOD PRESSURE: 116 MMHG | OXYGEN SATURATION: 98 % | TEMPERATURE: 98.6 F

## 2024-03-04 DIAGNOSIS — Z88.9 MULTIPLE ALLERGIES: ICD-10-CM

## 2024-03-04 DIAGNOSIS — J30.2 SEASONAL ALLERGIES: Primary | ICD-10-CM

## 2024-03-04 DIAGNOSIS — Z87.2 HISTORY OF ECZEMA AS A CHILD: ICD-10-CM

## 2024-03-04 DIAGNOSIS — Z84.89 FAMILY HISTORY OF SEVERE ALLERGY: ICD-10-CM

## 2024-03-04 DIAGNOSIS — Z12.11 SCREENING FOR MALIGNANT NEOPLASM OF COLON: ICD-10-CM

## 2024-03-04 DIAGNOSIS — D70.8 OTHER NEUTROPENIA (HCC): ICD-10-CM

## 2024-03-04 PROCEDURE — 99214 OFFICE O/P EST MOD 30 MIN: CPT | Performed by: INTERNAL MEDICINE

## 2024-03-04 NOTE — PROGRESS NOTES
Assessment/Plan:    Multiple allergies  -Patient has possible food allergy and a history of seasonal allergies, medication allergy and previous history of eczema and a family history of severe nut allergies in her daughter.  -Will order food allergy profile as requested and follow-up with results    Colon cancer screen  -Patient has not yet had colon cancer screening and would prefer to have the Cologuard  -Will order Cologuard as requested    Neutropenia  -Worsening  -Will order a CBC and follow-up with results  -She was encouraged to take a daily multivitamin     Diagnoses and all orders for this visit:    Seasonal allergies  -     Food Allergy Profile; Future  -     CBC and differential; Future    Screening for malignant neoplasm of colon  -     Cologuard    History of eczema as a child  -     Food Allergy Profile; Future  -     CBC and differential; Future    Family history of severe allergy  -     Food Allergy Profile; Future  -     CBC and differential; Future    Multiple allergies  -     Food Allergy Profile; Future  -     CBC and differential; Future    Other neutropenia (HCC)  -     CBC and differential; Future             Subjective:      Patient ID: Nidia Gasca is a 53 y.o. female.    HPI  Pt presents with a request that she would  like to get tested for food allergies.  She noticed that she has been having issues with nuts for the past 6 months.  She bought a big container of mixed nuts and noticed that when she eats the nuts she would have a tingling sensation on her tongue.  She denies any other symptoms. She denies any rashes, fever, chills, night sweats, headache, dizziness, nasal congestion, runny nose, pnd, sore throat, ear ache, sinus pain or pressure, wheezing, cough, chest pain, sob, palpitations, nausea, vomiting, diarrhea, constipation, hematochezia, hematuria, melena stools, arthralgias, myalgias, or insomnia.  She is concerned because her daughter is severely allergic to pecans and walnuts.   She has a history of mild seasonal allergies and also had eczema as a child.  She denies any history of asthma.  She normally eats single nuts - pistachio, almonds, cashew nuts and peanuts and this big container also contains macadamia and vale nuts.  She does not have any hx of allergy to other foods   Of note, her last CBC showed worsening leukopenia she admits to a history of thrombocytopenia.      The following portions of the patient's history were reviewed and updated as appropriate: She  has a past medical history of BRCA1 negative and BRCA2 negative.  She   Patient Active Problem List    Diagnosis Date Noted   • Annual physical exam 10/31/2022   • Postmenopausal 10/31/2022     She  has a past surgical history that includes Breast surgery ();  section (); Hernia repair (); Lymph node biopsy (); and Augmentation mammaplasty.  Her family history includes Arthritis in her mother; Breast cancer (age of onset: 48) in her sister; Breast cancer additional onset (age of onset: 80) in her maternal aunt; Cancer (age of onset: 82) in her mother; Coronary artery disease in her mother; Glaucoma in her mother; Heart disease in her father; Lung cancer in her maternal grandfather; No Known Problems in her daughter, maternal grandmother, maternal uncle, paternal grandfather, and paternal grandmother; Throat cancer in her maternal uncle.  She  reports that she has never smoked. She has never used smokeless tobacco. She reports current alcohol use of about 1.0 standard drink of alcohol per week. She reports that she does not use drugs.  Current Outpatient Medications   Medication Sig Dispense Refill   • Cholecalciferol 25 MCG (1000 UT) tablet Take 2,000 Units by mouth daily     • mometasone (ELOCON) 0.1 % cream Apply topically daily     • Omega-3 Fatty Acids (FISH OIL OMEGA-3 PO) Take 1 g by mouth daily     • acetaminophen (TYLENOL) 325 mg tablet Take 650 mg by mouth every 6 (six) hours as needed  "(Patient not taking: Reported on 11/25/2023)       No current facility-administered medications for this visit.     Current Outpatient Medications on File Prior to Visit   Medication Sig   • Cholecalciferol 25 MCG (1000 UT) tablet Take 2,000 Units by mouth daily   • mometasone (ELOCON) 0.1 % cream Apply topically daily   • Omega-3 Fatty Acids (FISH OIL OMEGA-3 PO) Take 1 g by mouth daily   • acetaminophen (TYLENOL) 325 mg tablet Take 650 mg by mouth every 6 (six) hours as needed (Patient not taking: Reported on 11/25/2023)   • [DISCONTINUED] predniSONE 20 mg tablet One po bid x 5 days.  Take with food     No current facility-administered medications on file prior to visit.     She is allergic to omeprazole..    Review of Systems   Constitutional:  Negative for activity change, chills, fatigue, fever and unexpected weight change.   HENT:  Negative for ear pain, postnasal drip, rhinorrhea, sinus pressure and sore throat.    Eyes:  Negative for pain.   Respiratory:  Negative for cough, choking, chest tightness, shortness of breath and wheezing.    Cardiovascular:  Negative for chest pain, palpitations and leg swelling.   Gastrointestinal:  Negative for abdominal pain, constipation, diarrhea, nausea and vomiting.   Genitourinary:  Negative for dysuria and hematuria.   Musculoskeletal:  Negative for arthralgias, back pain, gait problem, joint swelling, myalgias and neck stiffness.   Skin:  Negative for pallor and rash.   Neurological:  Negative for dizziness, tremors, seizures, syncope, light-headedness and headaches.   Hematological:  Negative for adenopathy.   Psychiatric/Behavioral:  Negative for behavioral problems.          Objective:      /60 (BP Location: Left arm, Patient Position: Sitting, Cuff Size: Standard)   Pulse 57   Temp 98.6 °F (37 °C) (Temporal)   Ht 5' 6\" (1.676 m)   Wt 67.1 kg (148 lb)   SpO2 98%   BMI 23.89 kg/m²          Physical Exam  Constitutional:       General: She is not in acute " distress.     Appearance: She is well-developed. She is not diaphoretic.   HENT:      Head: Normocephalic and atraumatic.      Right Ear: External ear normal.      Left Ear: External ear normal.      Nose: Nose normal.      Mouth/Throat:      Mouth: Mucous membranes are dry.      Pharynx: Posterior oropharyngeal erythema present. No oropharyngeal exudate.   Eyes:      General: No scleral icterus.        Right eye: No discharge.         Left eye: No discharge.      Conjunctiva/sclera: Conjunctivae normal.      Pupils: Pupils are equal, round, and reactive to light.   Neck:      Thyroid: No thyromegaly.      Vascular: No JVD.      Trachea: No tracheal deviation.   Cardiovascular:      Rate and Rhythm: Normal rate and regular rhythm.      Heart sounds: Normal heart sounds. No murmur heard.     No friction rub. No gallop.   Pulmonary:      Effort: Pulmonary effort is normal. No respiratory distress.      Breath sounds: Normal breath sounds. No wheezing or rales.   Chest:      Chest wall: No tenderness.   Abdominal:      General: Bowel sounds are normal. There is no distension.      Palpations: Abdomen is soft. There is no mass.      Tenderness: There is no abdominal tenderness. There is no guarding or rebound.   Musculoskeletal:         General: No tenderness or deformity. Normal range of motion.      Cervical back: Normal range of motion and neck supple.   Lymphadenopathy:      Head:      Right side of head: Submandibular adenopathy present.      Left side of head: Submandibular adenopathy present.      Cervical: No cervical adenopathy.   Skin:     General: Skin is warm and dry.      Coloration: Skin is not pale.      Findings: No erythema or rash.   Neurological:      Mental Status: She is alert and oriented to person, place, and time.      Cranial Nerves: No cranial nerve deficit.      Motor: No abnormal muscle tone.      Coordination: Coordination normal.      Deep Tendon Reflexes: Reflexes are normal and symmetric.    Psychiatric:         Behavior: Behavior normal.           Appointment on 02/17/2024   Component Date Value Ref Range Status   • WBC 02/17/2024 3.64 (L)  4.31 - 10.16 Thousand/uL Final   • RBC 02/17/2024 4.42  3.81 - 5.12 Million/uL Final   • Hemoglobin 02/17/2024 13.9  11.5 - 15.4 g/dL Final   • Hematocrit 02/17/2024 42.4  34.8 - 46.1 % Final   • MCV 02/17/2024 96  82 - 98 fL Final   • MCH 02/17/2024 31.4  26.8 - 34.3 pg Final   • MCHC 02/17/2024 32.8  31.4 - 37.4 g/dL Final   • RDW 02/17/2024 11.8  11.6 - 15.1 % Final   • MPV 02/17/2024 12.1  8.9 - 12.7 fL Final   • Platelets 02/17/2024 220  149 - 390 Thousands/uL Final   • nRBC 02/17/2024 0  /100 WBCs Final   • Neutrophils Relative 02/17/2024 49  43 - 75 % Final   • Immat GRANS % 02/17/2024 0  0 - 2 % Final   • Lymphocytes Relative 02/17/2024 38  14 - 44 % Final   • Monocytes Relative 02/17/2024 10  4 - 12 % Final   • Eosinophils Relative 02/17/2024 2  0 - 6 % Final   • Basophils Relative 02/17/2024 1  0 - 1 % Final   • Neutrophils Absolute 02/17/2024 1.75 (L)  1.85 - 7.62 Thousands/µL Final   • Immature Grans Absolute 02/17/2024 0.01  0.00 - 0.20 Thousand/uL Final   • Lymphocytes Absolute 02/17/2024 1.39  0.60 - 4.47 Thousands/µL Final   • Monocytes Absolute 02/17/2024 0.38  0.17 - 1.22 Thousand/µL Final   • Eosinophils Absolute 02/17/2024 0.08  0.00 - 0.61 Thousand/µL Final   • Basophils Absolute 02/17/2024 0.03  0.00 - 0.10 Thousands/µL Final   • TSH 3RD GENERATON 02/17/2024 1.654  0.450 - 4.500 uIU/mL Final    The recommended reference ranges for TSH during pregnancy are as follows:   First trimester 0.100 to 2.500 uIU/mL   Second trimester  0.200 to 3.000 uIU/mL   Third trimester 0.300 to 3.000 uIU/m    Note: Normal ranges may not apply to patients who are transgender, non-binary, or whose legal sex, sex at birth, and gender identity differ.  Adult TSH (3rd generation) reference range follows the recommended guidelines of the American Thyroid Association,  January, 2020.   • Sodium 02/17/2024 141  135 - 147 mmol/L Final   • Potassium 02/17/2024 4.1  3.5 - 5.3 mmol/L Final   • Chloride 02/17/2024 104  96 - 108 mmol/L Final   • CO2 02/17/2024 29  21 - 32 mmol/L Final   • ANION GAP 02/17/2024 8  mmol/L Final   • BUN 02/17/2024 15  5 - 25 mg/dL Final   • Creatinine 02/17/2024 0.75  0.60 - 1.30 mg/dL Final    Standardized to IDMS reference method   • Glucose, Fasting 02/17/2024 83  65 - 99 mg/dL Final   • Calcium 02/17/2024 9.4  8.4 - 10.2 mg/dL Final   • AST 02/17/2024 15  13 - 39 U/L Final   • ALT 02/17/2024 12  7 - 52 U/L Final    Specimen collection should occur prior to Sulfasalazine administration due to the potential for falsely depressed results.    • Alkaline Phosphatase 02/17/2024 72  34 - 104 U/L Final   • Total Protein 02/17/2024 6.7  6.4 - 8.4 g/dL Final   • Albumin 02/17/2024 4.2  3.5 - 5.0 g/dL Final   • Total Bilirubin 02/17/2024 0.77  0.20 - 1.00 mg/dL Final    Use of this assay is not recommended for patients undergoing treatment with eltrombopag due to the potential for falsely elevated results.  N-acetyl-p-benzoquinone imine (metabolite of Acetaminophen) will generate erroneously low results in samples for patients that have taken an overdose of Acetaminophen.   • eGFR 02/17/2024 91  ml/min/1.73sq m Final   • Cholesterol 02/17/2024 206 (H)  See Comment mg/dL Final    Cholesterol:         Pediatric <18 Years        Desirable          <170 mg/dL      Borderline High    170-199 mg/dL      High               >=200 mg/dL        Adult >=18 Years            Desirable         <200 mg/dL      Borderline High   200-239 mg/dL      High              >239 mg/dL     • Triglycerides 02/17/2024 117  See Comment mg/dL Final    Triglyceride:     0-9Y            <75mg/dL     10Y-17Y         <90 mg/dL       >=18Y     Normal          <150 mg/dL     Borderline High 150-199 mg/dL     High            200-499 mg/dL        Very High       >499 mg/dL    Specimen collection should  occur prior to Metamizole administration due to the potential for falsely depressed results.   • HDL, Direct 02/17/2024 58  >=50 mg/dL Final   • LDL Calculated 02/17/2024 125 (H)  0 - 100 mg/dL Final    LDL Cholesterol:     Optimal           <100 mg/dl     Near Optimal      100-129 mg/dl     Above Optimal       Borderline High 130-159 mg/dl       High            160-189 mg/dl       Very High       >189 mg/dl         This screening LDL is a calculated result.   It does not have the accuracy of the Direct Measured LDL in the monitoring of patients with hyperlipidemia and/or statin therapy.   Direct Measure LDL (PAI711) must be ordered separately in these patients.   • Non-HDL-Chol (CHOL-HDL) 02/17/2024 148  mg/dl Final

## 2024-03-16 ENCOUNTER — APPOINTMENT (OUTPATIENT)
Dept: LAB | Facility: IMAGING CENTER | Age: 54
End: 2024-03-16
Payer: COMMERCIAL

## 2024-03-16 DIAGNOSIS — Z88.9 MULTIPLE ALLERGIES: ICD-10-CM

## 2024-03-16 DIAGNOSIS — Z84.89 FAMILY HISTORY OF SEVERE ALLERGY: ICD-10-CM

## 2024-03-16 DIAGNOSIS — J30.2 SEASONAL ALLERGIES: ICD-10-CM

## 2024-03-16 DIAGNOSIS — Z87.2 HISTORY OF ECZEMA AS A CHILD: ICD-10-CM

## 2024-03-16 DIAGNOSIS — D70.8 OTHER NEUTROPENIA (HCC): ICD-10-CM

## 2024-03-16 LAB
BASOPHILS # BLD AUTO: 0.04 THOUSANDS/ÂΜL (ref 0–0.1)
BASOPHILS NFR BLD AUTO: 1 % (ref 0–1)
EOSINOPHIL # BLD AUTO: 0.06 THOUSAND/ÂΜL (ref 0–0.61)
EOSINOPHIL NFR BLD AUTO: 1 % (ref 0–6)
ERYTHROCYTE [DISTWIDTH] IN BLOOD BY AUTOMATED COUNT: 11.7 % (ref 11.6–15.1)
HCT VFR BLD AUTO: 41.6 % (ref 34.8–46.1)
HGB BLD-MCNC: 13.5 G/DL (ref 11.5–15.4)
IMM GRANULOCYTES # BLD AUTO: 0.01 THOUSAND/UL (ref 0–0.2)
IMM GRANULOCYTES NFR BLD AUTO: 0 % (ref 0–2)
LYMPHOCYTES # BLD AUTO: 1.53 THOUSANDS/ÂΜL (ref 0.6–4.47)
LYMPHOCYTES NFR BLD AUTO: 28 % (ref 14–44)
MCH RBC QN AUTO: 31.1 PG (ref 26.8–34.3)
MCHC RBC AUTO-ENTMCNC: 32.5 G/DL (ref 31.4–37.4)
MCV RBC AUTO: 96 FL (ref 82–98)
MONOCYTES # BLD AUTO: 0.43 THOUSAND/ÂΜL (ref 0.17–1.22)
MONOCYTES NFR BLD AUTO: 8 % (ref 4–12)
NEUTROPHILS # BLD AUTO: 3.46 THOUSANDS/ÂΜL (ref 1.85–7.62)
NEUTS SEG NFR BLD AUTO: 62 % (ref 43–75)
NRBC BLD AUTO-RTO: 0 /100 WBCS
PLATELET # BLD AUTO: 224 THOUSANDS/UL (ref 149–390)
PMV BLD AUTO: 12.1 FL (ref 8.9–12.7)
RBC # BLD AUTO: 4.34 MILLION/UL (ref 3.81–5.12)
WBC # BLD AUTO: 5.53 THOUSAND/UL (ref 4.31–10.16)

## 2024-03-16 PROCEDURE — 82785 ASSAY OF IGE: CPT

## 2024-03-16 PROCEDURE — 36415 COLL VENOUS BLD VENIPUNCTURE: CPT

## 2024-03-16 PROCEDURE — 85025 COMPLETE CBC W/AUTO DIFF WBC: CPT

## 2024-03-16 PROCEDURE — 86003 ALLG SPEC IGE CRUDE XTRC EA: CPT

## 2024-03-16 PROCEDURE — 86008 ALLG SPEC IGE RECOMB EA: CPT

## 2024-03-19 LAB
ALMOND IGE QN: <0.1 KUA/I
CASHEW NUT IGE QN: <0.1 KUA/I
CODFISH IGE QN: <0.1 KUA/I
EGG WHITE IGE QN: 0.11 KUA/I
GLUTEN IGE QN: <0.1 KUA/I
HAZELNUT IGE QN: <0.1 KUA/L
MILK IGE QN: 0.2 KUA/I
PEANUT IGE QN: <0.1 KUA/I
SALMON IGE QN: <0.1 KUA/I
SCALLOP IGE QN: <0.1 KUA/L
SESAME SEED IGE QN: <0.1 KUA/I
SHRIMP IGE QN: 0.12 KUA/L
SOYBEAN IGE QN: <0.1 KUA/I
TOTAL IGE SMQN RAST: 149 KU/L (ref 0–113)
TUNA IGE QN: <0.1 KUA/I
WALNUT IGE QN: <0.1 KUA/I
WHEAT IGE QN: <0.1 KUA/I

## 2024-03-22 LAB
A-LACTALB IGE QN: <0.1 KAU/I
B-LACTOGLOB IGE QN: <0.1 KAU/I
CASEIN IGE QN: <0.1 KAU/I
OVALB IGE QN: <0.1 KAU/I
OVOMUCOID IGE QN: 0.11 KAU/I

## 2024-06-10 ENCOUNTER — HOSPITAL ENCOUNTER (OUTPATIENT)
Dept: RADIOLOGY | Facility: IMAGING CENTER | Age: 54
Discharge: HOME | End: 2024-06-10
Payer: COMMERCIAL

## 2024-06-10 DIAGNOSIS — R31.21 ASYMPTOMATIC MICROSCOPIC HEMATURIA: ICD-10-CM

## 2024-06-10 PROCEDURE — 76775 US EXAM ABDO BACK WALL LIM: CPT

## 2024-06-19 ENCOUNTER — HOSPITAL ENCOUNTER (OUTPATIENT)
Dept: RADIOLOGY | Facility: IMAGING CENTER | Age: 54
Discharge: HOME | End: 2024-06-19
Payer: COMMERCIAL

## 2024-06-19 DIAGNOSIS — R07.89 OTHER CHEST PAIN: ICD-10-CM

## 2024-06-19 DIAGNOSIS — M25.511 PAIN IN RIGHT SHOULDER: ICD-10-CM

## 2024-06-19 PROCEDURE — 76705 ECHO EXAM OF ABDOMEN: CPT

## 2024-07-09 ENCOUNTER — HOSPITAL ENCOUNTER (OUTPATIENT)
Dept: RADIOLOGY | Facility: IMAGING CENTER | Age: 54
Discharge: HOME | End: 2024-07-09
Payer: COMMERCIAL

## 2024-07-09 VITALS — HEIGHT: 66 IN | WEIGHT: 148 LBS | BODY MASS INDEX: 23.78 KG/M2

## 2024-07-09 DIAGNOSIS — Z12.31 ENCOUNTER FOR SCREENING MAMMOGRAM FOR MALIGNANT NEOPLASM OF BREAST: ICD-10-CM

## 2024-07-09 PROCEDURE — 77063 BREAST TOMOSYNTHESIS BI: CPT

## 2024-07-09 PROCEDURE — 77067 SCR MAMMO BI INCL CAD: CPT

## 2024-07-28 LAB — COLOGUARD RESULT REPORTABLE: NEGATIVE

## 2024-10-15 ENCOUNTER — OFFICE VISIT (OUTPATIENT)
Dept: INTERNAL MEDICINE CLINIC | Age: 54
End: 2024-10-15
Payer: COMMERCIAL

## 2024-10-15 VITALS
DIASTOLIC BLOOD PRESSURE: 76 MMHG | OXYGEN SATURATION: 98 % | BODY MASS INDEX: 23.78 KG/M2 | TEMPERATURE: 97.3 F | SYSTOLIC BLOOD PRESSURE: 130 MMHG | WEIGHT: 148 LBS | HEART RATE: 56 BPM | HEIGHT: 66 IN

## 2024-10-15 DIAGNOSIS — R31.21 ASYMPTOMATIC MICROSCOPIC HEMATURIA: ICD-10-CM

## 2024-10-15 DIAGNOSIS — R42 LIGHT HEADED: ICD-10-CM

## 2024-10-15 DIAGNOSIS — I44.7 LBBB (LEFT BUNDLE BRANCH BLOCK): Primary | ICD-10-CM

## 2024-10-15 PROCEDURE — 99214 OFFICE O/P EST MOD 30 MIN: CPT | Performed by: INTERNAL MEDICINE

## 2024-10-15 NOTE — ASSESSMENT & PLAN NOTE
Patient had microscopic hematuria without any symptoms was seen by the urology and cystoscopy was done patient will be followed up for that

## 2024-10-15 NOTE — PROGRESS NOTES
Ambulatory Visit  Name: Nidia Gasca      : 1970      MRN: [id number]  Encounter Provider: Elvira Salinas MD  Encounter Date: 10/15/2024   Encounter department: [facility]    Assessment & Plan  LBBB (left bundle branch block)  Patient was seen at the emergency room was found to have left bundle branch block cardiology follow-up was done and echocardiogram was done which was unremarkable.  She was seen for the back and the chest area pain now there is no more pain  Light headed  Lightheadedness is resolved  Orders:    CBC and differential; Future    Comprehensive metabolic panel; Future    Lipid panel; Future    UA w Reflex to Microscopic w Reflex to Culture; Future    TSH, 3rd generation; Future    Asymptomatic microscopic hematuria  Patient had microscopic hematuria without any symptoms was seen by the urology and cystoscopy was done patient will be followed up for that          History of Present Illness     53 years young lady who is here today for the regular follow-up    He is doing well recently was seen in the emergency room for lightheadedness left bundle branch block echocardiogram and cardiology follow-up was done which was essentially unremarkable I reviewed the results of the echocardiogram with her    History of hematuria comes and goes was recently seen by the urology and a cystoscopy was done and they were going to follow her up    Continues only taking the calcium and vitamin D and omega-3 so she is not on any medications on the regular basis her blood pressure is normal    Going into the preventive care she gets regular Pap smear and mammogram all normal so far she will be getting the Cologuard test she is low risk for the colon cancer so the Cologuard will be appropriate        History obtained from : patient  Review of Systems   Constitutional:  Negative for chills and fatigue.   HENT:  Negative for congestion, ear pain, hearing loss, postnasal drip,  "sinus pressure, sore throat and voice change.    Eyes:  Negative for pain, discharge and visual disturbance.   Respiratory:  Negative for cough, chest tightness and shortness of breath.    Cardiovascular:  Negative for chest pain, palpitations and leg swelling.   Gastrointestinal:  Negative for abdominal pain, blood in stool, diarrhea, nausea and rectal pain.   Genitourinary:  Negative for difficulty urinating, dysuria and urgency.   Musculoskeletal:  Negative for arthralgias and joint swelling.   Skin:  Negative for rash.   Allergic/Immunologic: Negative for environmental allergies and food allergies.   Neurological:  Negative for dizziness, tremors, weakness, numbness and headaches.   Hematological:  Negative for adenopathy.   Psychiatric/Behavioral:  Negative for behavioral problems and hallucinations.      Medical History Reviewed by provider this encounter:       Current Outpatient Medications on File Prior to Visit   Medication Sig Dispense Refill    acetaminophen (TYLENOL) 325 mg tablet Take 650 mg by mouth every 6 (six) hours as needed      Cholecalciferol 25 MCG (1000 UT) tablet Take 2,000 Units by mouth daily      mometasone (ELOCON) 0.1 % cream Apply topically daily      Omega-3 Fatty Acids (FISH OIL OMEGA-3 PO) Take 1 g by mouth daily       No current facility-administered medications on file prior to visit.          Objective     /76 (BP Location: Left arm, Patient Position: Sitting, Cuff Size: Standard)   Pulse 56   Temp (!) 97.3 °F (36.3 °C) (Temporal)   Ht 5' 6\" (1.676 m)   Wt 67.1 kg (148 lb)   SpO2 98%   BMI 23.89 kg/m²     Physical Exam  Constitutional:       Appearance: She is well-developed.   HENT:      Right Ear: Tympanic membrane and external ear normal.      Left Ear: Tympanic membrane normal.   Eyes:      Conjunctiva/sclera: Conjunctivae normal.      Pupils: Pupils are equal, round, and reactive to light.   Neck:      Thyroid: No thyromegaly.      Vascular: No JVD. "   Cardiovascular:      Rate and Rhythm: Normal rate and regular rhythm.      Heart sounds: Normal heart sounds.   Pulmonary:      Breath sounds: Normal breath sounds.   Abdominal:      General: Bowel sounds are normal.      Palpations: Abdomen is soft.   Musculoskeletal:         General: Normal range of motion.      Cervical back: Normal range of motion.   Lymphadenopathy:      Cervical: No cervical adenopathy.   Skin:     General: Skin is dry.   Neurological:      General: No focal deficit present.      Mental Status: She is alert and oriented to person, place, and time. Mental status is at baseline.      Deep Tendon Reflexes: Reflexes are normal and symmetric.   Psychiatric:         Mood and Affect: Mood normal.         Behavior: Behavior normal.

## 2024-10-15 NOTE — ASSESSMENT & PLAN NOTE
Lightheadedness is resolved  Orders:    CBC and differential; Future    Comprehensive metabolic panel; Future    Lipid panel; Future    UA w Reflex to Microscopic w Reflex to Culture; Future    TSH, 3rd generation; Future

## 2024-10-15 NOTE — ASSESSMENT & PLAN NOTE
Patient was seen at the emergency room was found to have left bundle branch block cardiology follow-up was done and echocardiogram was done which was unremarkable.  She was seen for the back and the chest area pain now there is no more pain

## 2024-11-05 ENCOUNTER — CLINICAL SUPPORT (OUTPATIENT)
Dept: INTERNAL MEDICINE CLINIC | Facility: OTHER | Age: 54
End: 2024-11-05
Payer: COMMERCIAL

## 2024-11-05 DIAGNOSIS — Z23 NEED FOR COVID-19 VACCINE: Primary | ICD-10-CM

## 2024-11-05 PROCEDURE — 90480 ADMN SARSCOV2 VAC 1/ONLY CMP: CPT

## 2024-11-05 PROCEDURE — 91320 SARSCV2 VAC 30MCG TRS-SUC IM: CPT

## 2025-07-24 ENCOUNTER — HOSPITAL ENCOUNTER (OUTPATIENT)
Dept: RADIOLOGY | Facility: IMAGING CENTER | Age: 55
End: 2025-07-24
Payer: COMMERCIAL

## 2025-07-24 VITALS — WEIGHT: 148 LBS | HEIGHT: 66 IN | BODY MASS INDEX: 23.78 KG/M2

## 2025-07-24 DIAGNOSIS — Z12.31 ENCOUNTER FOR SCREENING MAMMOGRAM FOR MALIGNANT NEOPLASM OF BREAST: ICD-10-CM

## 2025-07-24 PROCEDURE — 77063 BREAST TOMOSYNTHESIS BI: CPT

## 2025-07-24 PROCEDURE — 77067 SCR MAMMO BI INCL CAD: CPT
